# Patient Record
Sex: MALE | Race: BLACK OR AFRICAN AMERICAN | NOT HISPANIC OR LATINO | URBAN - METROPOLITAN AREA
[De-identification: names, ages, dates, MRNs, and addresses within clinical notes are randomized per-mention and may not be internally consistent; named-entity substitution may affect disease eponyms.]

---

## 2024-09-20 ENCOUNTER — EMERGENCY (EMERGENCY)
Facility: HOSPITAL | Age: 44
LOS: 1 days | Discharge: HOME | End: 2024-09-20
Attending: STUDENT IN AN ORGANIZED HEALTH CARE EDUCATION/TRAINING PROGRAM | Admitting: STUDENT IN AN ORGANIZED HEALTH CARE EDUCATION/TRAINING PROGRAM
Payer: COMMERCIAL

## 2024-09-20 VITALS
TEMPERATURE: 99 F | OXYGEN SATURATION: 96 % | SYSTOLIC BLOOD PRESSURE: 112 MMHG | HEIGHT: 71 IN | HEART RATE: 64 BPM | RESPIRATION RATE: 16 BRPM | DIASTOLIC BLOOD PRESSURE: 78 MMHG | WEIGHT: 184.97 LBS

## 2024-09-20 VITALS
SYSTOLIC BLOOD PRESSURE: 124 MMHG | OXYGEN SATURATION: 99 % | RESPIRATION RATE: 19 BRPM | HEART RATE: 61 BPM | DIASTOLIC BLOOD PRESSURE: 77 MMHG

## 2024-09-20 LAB
ALBUMIN SERPL ELPH-MCNC: 4.5 G/DL — SIGNIFICANT CHANGE UP (ref 3.3–5)
ALP SERPL-CCNC: 48 U/L — SIGNIFICANT CHANGE UP (ref 40–120)
ALT FLD-CCNC: 47 U/L — HIGH (ref 4–41)
ANION GAP SERPL CALC-SCNC: 12 MMOL/L — SIGNIFICANT CHANGE UP (ref 7–14)
AST SERPL-CCNC: 27 U/L — SIGNIFICANT CHANGE UP (ref 4–40)
BASOPHILS # BLD AUTO: 0.02 K/UL — SIGNIFICANT CHANGE UP (ref 0–0.2)
BASOPHILS NFR BLD AUTO: 0.3 % — SIGNIFICANT CHANGE UP (ref 0–2)
BILIRUB SERPL-MCNC: 0.3 MG/DL — SIGNIFICANT CHANGE UP (ref 0.2–1.2)
BUN SERPL-MCNC: 11 MG/DL — SIGNIFICANT CHANGE UP (ref 7–23)
CALCIUM SERPL-MCNC: 9.6 MG/DL — SIGNIFICANT CHANGE UP (ref 8.4–10.5)
CHLORIDE SERPL-SCNC: 103 MMOL/L — SIGNIFICANT CHANGE UP (ref 98–107)
CO2 SERPL-SCNC: 24 MMOL/L — SIGNIFICANT CHANGE UP (ref 22–31)
CREAT SERPL-MCNC: 0.92 MG/DL — SIGNIFICANT CHANGE UP (ref 0.5–1.3)
EGFR: 107 ML/MIN/1.73M2 — SIGNIFICANT CHANGE UP
EOSINOPHIL # BLD AUTO: 0.31 K/UL — SIGNIFICANT CHANGE UP (ref 0–0.5)
EOSINOPHIL NFR BLD AUTO: 4.7 % — SIGNIFICANT CHANGE UP (ref 0–6)
GLUCOSE SERPL-MCNC: 93 MG/DL — SIGNIFICANT CHANGE UP (ref 70–99)
HCT VFR BLD CALC: 47.2 % — SIGNIFICANT CHANGE UP (ref 39–50)
HGB BLD-MCNC: 15.9 G/DL — SIGNIFICANT CHANGE UP (ref 13–17)
IANC: 4.07 K/UL — SIGNIFICANT CHANGE UP (ref 1.8–7.4)
IMM GRANULOCYTES NFR BLD AUTO: 0.2 % — SIGNIFICANT CHANGE UP (ref 0–0.9)
LYMPHOCYTES # BLD AUTO: 1.44 K/UL — SIGNIFICANT CHANGE UP (ref 1–3.3)
LYMPHOCYTES # BLD AUTO: 21.8 % — SIGNIFICANT CHANGE UP (ref 13–44)
MAGNESIUM SERPL-MCNC: 2.1 MG/DL — SIGNIFICANT CHANGE UP (ref 1.6–2.6)
MCHC RBC-ENTMCNC: 27.7 PG — SIGNIFICANT CHANGE UP (ref 27–34)
MCHC RBC-ENTMCNC: 33.7 GM/DL — SIGNIFICANT CHANGE UP (ref 32–36)
MCV RBC AUTO: 82.2 FL — SIGNIFICANT CHANGE UP (ref 80–100)
MONOCYTES # BLD AUTO: 0.75 K/UL — SIGNIFICANT CHANGE UP (ref 0–0.9)
MONOCYTES NFR BLD AUTO: 11.4 % — SIGNIFICANT CHANGE UP (ref 2–14)
NEUTROPHILS # BLD AUTO: 4.07 K/UL — SIGNIFICANT CHANGE UP (ref 1.8–7.4)
NEUTROPHILS NFR BLD AUTO: 61.6 % — SIGNIFICANT CHANGE UP (ref 43–77)
NRBC # BLD: 0 /100 WBCS — SIGNIFICANT CHANGE UP (ref 0–0)
NRBC # FLD: 0 K/UL — SIGNIFICANT CHANGE UP (ref 0–0)
PHOSPHATE SERPL-MCNC: 3.2 MG/DL — SIGNIFICANT CHANGE UP (ref 2.5–4.5)
PLATELET # BLD AUTO: 245 K/UL — SIGNIFICANT CHANGE UP (ref 150–400)
POTASSIUM SERPL-MCNC: 4.2 MMOL/L — SIGNIFICANT CHANGE UP (ref 3.5–5.3)
POTASSIUM SERPL-SCNC: 4.2 MMOL/L — SIGNIFICANT CHANGE UP (ref 3.5–5.3)
PROT SERPL-MCNC: 7.7 G/DL — SIGNIFICANT CHANGE UP (ref 6–8.3)
RBC # BLD: 5.74 M/UL — SIGNIFICANT CHANGE UP (ref 4.2–5.8)
RBC # FLD: 15.7 % — HIGH (ref 10.3–14.5)
SODIUM SERPL-SCNC: 139 MMOL/L — SIGNIFICANT CHANGE UP (ref 135–145)
WBC # BLD: 6.6 K/UL — SIGNIFICANT CHANGE UP (ref 3.8–10.5)
WBC # FLD AUTO: 6.6 K/UL — SIGNIFICANT CHANGE UP (ref 3.8–10.5)

## 2024-09-20 PROCEDURE — 73030 X-RAY EXAM OF SHOULDER: CPT | Mod: 26,RT

## 2024-09-20 PROCEDURE — 72125 CT NECK SPINE W/O DYE: CPT | Mod: 26,MC

## 2024-09-20 PROCEDURE — 72040 X-RAY EXAM NECK SPINE 2-3 VW: CPT | Mod: 26

## 2024-09-20 PROCEDURE — 99284 EMERGENCY DEPT VISIT MOD MDM: CPT

## 2024-09-20 PROCEDURE — 70450 CT HEAD/BRAIN W/O DYE: CPT | Mod: 26,MC

## 2024-09-20 RX ORDER — LIDOCAINE/BENZALKONIUM/ALCOHOL
1 SOLUTION, NON-ORAL TOPICAL
Qty: 3 | Refills: 0
Start: 2024-09-20

## 2024-09-20 RX ORDER — ACETAMINOPHEN 325 MG/1
975 TABLET ORAL ONCE
Refills: 0 | Status: COMPLETED | OUTPATIENT
Start: 2024-09-20 | End: 2024-09-20

## 2024-09-20 RX ORDER — SODIUM CHLORIDE 9 MG/ML
1000 INJECTION INTRAMUSCULAR; INTRAVENOUS; SUBCUTANEOUS ONCE
Refills: 0 | Status: COMPLETED | OUTPATIENT
Start: 2024-09-20 | End: 2024-09-20

## 2024-09-20 RX ORDER — CYCLOBENZAPRINE HCL 10 MG
1 TABLET ORAL
Qty: 15 | Refills: 0
Start: 2024-09-20

## 2024-09-20 RX ORDER — KETOROLAC TROMETHAMINE 30 MG/ML
15 INJECTION, SOLUTION INTRAMUSCULAR ONCE
Refills: 0 | Status: DISCONTINUED | OUTPATIENT
Start: 2024-09-20 | End: 2024-09-20

## 2024-09-20 RX ADMIN — SODIUM CHLORIDE 1000 MILLILITER(S): 9 INJECTION INTRAMUSCULAR; INTRAVENOUS; SUBCUTANEOUS at 14:50

## 2024-09-20 RX ADMIN — ACETAMINOPHEN 975 MILLIGRAM(S): 325 TABLET ORAL at 14:50

## 2024-09-20 RX ADMIN — KETOROLAC TROMETHAMINE 15 MILLIGRAM(S): 30 INJECTION, SOLUTION INTRAMUSCULAR at 14:50

## 2024-09-20 NOTE — ED PROVIDER NOTE - CARE PLAN
Principal Discharge DX:	Headache  Secondary Diagnosis:	Right shoulder pain  Secondary Diagnosis:	Neck pain   1

## 2024-09-20 NOTE — ED PROVIDER NOTE - CLINICAL SUMMARY MEDICAL DECISION MAKING FREE TEXT BOX
43-year-old man no significant past medical history presenting with 2 months of intermittent headache associated with 2 weeks of neck pain and right-sided shoulder pain.  Patient states he does not normally get headaches, 2 months ago developed daily headaches, no temporal association, not every day but every other day, bilaterally occipital in region, not associated with position, has not trialed any medications for pain relief at home.   Headaches are associated with photophobia, patient denies dizziness/lightheadedness, visual/hearing changes, nausea/vomiting, focal weakness, sensory changes, gait disturbances, fevers.  Patient also reports 2 weeks of atraumatic worsening right sided shoulder pain and neck pain, can range right arm with pain.  Patient does not normally get headaches, went to urgent care last weekend and was referred to neurologist, saw neurologist this past Tuesday and is pending MRI.  No previous neuroimaging.  ROS otherwise negative.    VS unremarkable  General: WN/WD NAD  Head: Atraumatic  Eyes: EOM grossly in tact, no scleral icterus, PERRL 3mm b/l  ENT: dry mucous membranes, no cervical LAD, neck supple  Neurology: A&Ox3, nonfocal, GERMAIN x 4, gait WNL  Respiratory: normal respiratory effort, ctab b/l  CV: Extremities warm and well perfused, rrr no m/r/g  Abdominal: Soft, non-distended, non-tender  Back: no c/t/l/s paraspinal midline ttp; no step-offs or bony deformities  Extremities: No edema; +limited abduction of R arm above shoulder level (>90 deg) 2/2 pain  Skin: No rashes    Diagnosis includes but is not limited to migraine headache versus  less likely stroke versus tumor versus ventriculomegaly; shoulder and neck pain may be secondary to radiculopathy versus MSK pain, no midline tenderness to suggest cervical spine injury or pathology.  will eval with labs, pain control,  x-ray shoulder and neck, CT head and C-spine, reassess; dispo likely to be DC home with neuro and spine follow-up pending workup. - Crescencio Recinos MD. EM Attending

## 2024-09-20 NOTE — ED PROVIDER NOTE - NSFOLLOWUPINSTRUCTIONS_ED_ALL_ED_FT
Over-the-counter Tylenol 500 mg (1 or 2 every 6 hours) and/or Naproxen 500 mg (1 every 12 hours) for pain control.    Flexeril muscle relaxant    Lidocaine patch to shoulder area    Follow with either:     Sp[facility] Sur[facility] Dr. Marshall:    [phone]  [email or web address]  Email: [email or web address]    1. [place] Address: [address], [facility] [address] [facility], [address]. Hours: Mon-Fri 9am - 5pm and every 2nd Sat 9am - 3pm.    2. [place] Address: [address] [facility] [address] [place] [address] ([facility]). Hours: Every Thursday 10am - 5pm.    3. [place] Address: [address] [facility], [facility] [facility] [address] ([address] [facility]). Hours: 10am - 5pm Every Tuesday (except 2nd) & Every 2nd Wednesday.    Or spine surgeon Dr. Hari Downs:    1. [address] [facility] [place] [address]  [phone]    2. [address] [place] [address]  [phone]    Or    [facility] (comprehensive, with Neurology, Orthopedic Surgery, Neurosurgery, and Physical Therapy): [phone].

## 2024-09-20 NOTE — ED PROVIDER NOTE - PATIENT PORTAL LINK FT
You can access the FollowMyHealth Patient Portal offered by [facility] by registering at the following website: [email or web address]. By joining [employer]’s FollowMyHealth portal, you will also be able to view your health information using other applications (apps) compatible with our system.

## 2024-09-20 NOTE — ED ADULT TRIAGE NOTE - CHIEF COMPLAINT QUOTE
Patient c/o shoulder and neck pain x few weeks, reports headache x months. Saw a neurologist and pending MRI. Reports pain is worse today. Denies injury, fever, dizziness, nausea, vomiting. Denies phx

## 2024-09-20 NOTE — ED PROVIDER NOTE - PROGRESS NOTE DETAILS
Pt feeling improved after pain meds, pending CT at this time. - Crescencio Recinos MD. EM Attending Nitish: Radiology results unremarkable. Lab results unremarkable.